# Patient Record
Sex: FEMALE | Race: WHITE | NOT HISPANIC OR LATINO | URBAN - METROPOLITAN AREA
[De-identification: names, ages, dates, MRNs, and addresses within clinical notes are randomized per-mention and may not be internally consistent; named-entity substitution may affect disease eponyms.]

---

## 2017-02-14 ENCOUNTER — IMPORTED ENCOUNTER (OUTPATIENT)
Dept: URBAN - METROPOLITAN AREA CLINIC 31 | Facility: CLINIC | Age: 37
End: 2017-02-14

## 2017-02-14 PROBLEM — H10.403: Noted: 2017-02-14

## 2017-02-14 PROBLEM — H43.812: Noted: 2017-02-14

## 2017-02-14 PROCEDURE — 92015 DETERMINE REFRACTIVE STATE: CPT

## 2017-02-14 PROCEDURE — 92014 COMPRE OPH EXAM EST PT 1/>: CPT

## 2017-02-14 NOTE — PATIENT DISCUSSION
1.  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 If this is a new problem patient will need to return for re-examination  as determined by the [address]. Allergic Conjunctivitis OU -- The condition was  discussed with the patient. Avoidance of allergens and cool compresses were recommended. 3. Return for an appointment in 12 months for comprehensive exam. with Dr. Silverio Fuller.

## 2020-09-08 ENCOUNTER — IMPORTED ENCOUNTER (OUTPATIENT)
Dept: URBAN - METROPOLITAN AREA CLINIC 31 | Facility: CLINIC | Age: 40
End: 2020-09-08

## 2020-09-08 PROBLEM — H43.812: Noted: 2020-09-08

## 2020-09-08 PROCEDURE — 92004 COMPRE OPH EXAM NEW PT 1/>: CPT

## 2020-09-08 PROCEDURE — 92015 DETERMINE REFRACTIVE STATE: CPT

## 2020-09-08 NOTE — PATIENT DISCUSSION
1.  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 If this is a new problem patient will need to return for re-examination  as determined by the [address] [facility]. 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 2. Astigmatism No glasses change.

## 2020-12-08 ENCOUNTER — IMPORTED ENCOUNTER (OUTPATIENT)
Dept: URBAN - METROPOLITAN AREA CLINIC 31 | Facility: CLINIC | Age: 40
End: 2020-12-08

## 2020-12-08 PROBLEM — D31.0: Noted: 2020-12-08

## 2020-12-08 PROCEDURE — 99213 OFFICE O/P EST LOW 20 MIN: CPT

## 2020-12-08 NOTE — PATIENT DISCUSSION
Conjunctival melanosis of recent origin. 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 Consult in FM with Dr. Eliana Franks and get external photo.

## 2021-01-22 ENCOUNTER — IMPORTED ENCOUNTER (OUTPATIENT)
Dept: URBAN - METROPOLITAN AREA CLINIC 31 | Facility: CLINIC | Age: 41
End: 2021-01-22

## 2021-01-22 PROBLEM — D31.0: Noted: 2021-01-22

## 2021-01-22 PROBLEM — D31.91: Noted: 2021-01-22

## 2021-01-22 PROCEDURE — 92285 EXTERNAL OCULAR PHOTOGRAPHY: CPT

## 2021-01-22 PROCEDURE — 99213 OFFICE O/P EST LOW 20 MIN: CPT

## 2021-01-22 NOTE — PATIENT DISCUSSION
Conjunctival melanosis appears Benign growth. Photo document today monitor UV protectionReturn for an appointment in 6 months for office call. with Dr. Cecilio Davis.

## 2022-04-02 ASSESSMENT — TONOMETRY
OD_IOP_MMHG: 15
OS_IOP_MMHG: 15
OD_IOP_MMHG: 14
OS_IOP_MMHG: 13

## 2022-04-02 ASSESSMENT — VISUAL ACUITY
OS_CC: 20/25
OD_CC: 20/25
OS_CC: 20/20
OD_CC: 20/20-2
OD_CC: 20/25
OD_CC: J116''
OS_CC: J1+14''
OS_CC: 20/20-1
OD_CC: 20/25
OS_CC: 20/20
OS_CC: J1+16''
OD_CC: J1+14''

## 2022-07-30 ENCOUNTER — TELEPHONE ENCOUNTER (OUTPATIENT)
Age: 42
End: 2022-07-30

## 2022-07-31 ENCOUNTER — TELEPHONE ENCOUNTER (OUTPATIENT)
Age: 42
End: 2022-07-31

## 2024-08-28 ENCOUNTER — PREPPED CHART (OUTPATIENT)
Dept: URBAN - METROPOLITAN AREA CLINIC 29 | Facility: CLINIC | Age: 44
End: 2024-08-28

## 2024-10-30 ENCOUNTER — NEW PATIENT (OUTPATIENT)
Dept: URBAN - METROPOLITAN AREA CLINIC 29 | Facility: CLINIC | Age: 44
End: 2024-10-30

## 2024-10-30 DIAGNOSIS — H52.13: ICD-10-CM

## 2024-10-30 DIAGNOSIS — H52.4: ICD-10-CM

## 2024-10-30 DIAGNOSIS — H52.223: ICD-10-CM

## 2024-10-30 PROCEDURE — 92015 DETERMINE REFRACTIVE STATE: CPT

## 2024-10-30 PROCEDURE — 92004 COMPRE OPH EXAM NEW PT 1/>: CPT
